# Patient Record
Sex: FEMALE | Race: BLACK OR AFRICAN AMERICAN | NOT HISPANIC OR LATINO | Employment: STUDENT | URBAN - METROPOLITAN AREA
[De-identification: names, ages, dates, MRNs, and addresses within clinical notes are randomized per-mention and may not be internally consistent; named-entity substitution may affect disease eponyms.]

---

## 2025-06-05 ENCOUNTER — HOSPITAL ENCOUNTER (EMERGENCY)
Facility: HOSPITAL | Age: 35
Discharge: HOME | End: 2025-06-05
Payer: MEDICAID

## 2025-06-05 VITALS
RESPIRATION RATE: 16 BRPM | BODY MASS INDEX: 27.28 KG/M2 | DIASTOLIC BLOOD PRESSURE: 72 MMHG | OXYGEN SATURATION: 100 % | SYSTOLIC BLOOD PRESSURE: 118 MMHG | HEIGHT: 68 IN | WEIGHT: 180 LBS | HEART RATE: 78 BPM | TEMPERATURE: 97 F

## 2025-06-05 DIAGNOSIS — M54.6 ACUTE RIGHT-SIDED THORACIC BACK PAIN: Primary | ICD-10-CM

## 2025-06-05 PROCEDURE — 96372 THER/PROPH/DIAG INJ SC/IM: CPT

## 2025-06-05 PROCEDURE — 99284 EMERGENCY DEPT VISIT MOD MDM: CPT

## 2025-06-05 PROCEDURE — 2500000001 HC RX 250 WO HCPCS SELF ADMINISTERED DRUGS (ALT 637 FOR MEDICARE OP)

## 2025-06-05 PROCEDURE — 2500000005 HC RX 250 GENERAL PHARMACY W/O HCPCS

## 2025-06-05 PROCEDURE — 2500000004 HC RX 250 GENERAL PHARMACY W/ HCPCS (ALT 636 FOR OP/ED)

## 2025-06-05 RX ORDER — KETOROLAC TROMETHAMINE 30 MG/ML
15 INJECTION, SOLUTION INTRAMUSCULAR; INTRAVENOUS ONCE
Status: COMPLETED | OUTPATIENT
Start: 2025-06-05 | End: 2025-06-05

## 2025-06-05 RX ORDER — METHOCARBAMOL 500 MG/1
500 TABLET, FILM COATED ORAL 2 TIMES DAILY
Qty: 10 TABLET | Refills: 0 | Status: SHIPPED | OUTPATIENT
Start: 2025-06-05 | End: 2025-06-10

## 2025-06-05 RX ORDER — ACETAMINOPHEN 325 MG/1
975 TABLET ORAL ONCE
Status: COMPLETED | OUTPATIENT
Start: 2025-06-05 | End: 2025-06-05

## 2025-06-05 RX ORDER — LIDOCAINE 560 MG/1
1 PATCH PERCUTANEOUS; TOPICAL; TRANSDERMAL ONCE
Status: DISCONTINUED | OUTPATIENT
Start: 2025-06-05 | End: 2025-06-05 | Stop reason: HOSPADM

## 2025-06-05 RX ORDER — ACETAMINOPHEN 500 MG
1000 TABLET ORAL EVERY 8 HOURS PRN
Qty: 18 TABLET | Refills: 0 | Status: SHIPPED | OUTPATIENT
Start: 2025-06-05 | End: 2025-06-08

## 2025-06-05 RX ORDER — NAPROXEN 500 MG/1
500 TABLET ORAL EVERY 12 HOURS
Qty: 6 TABLET | Refills: 0 | Status: SHIPPED | OUTPATIENT
Start: 2025-06-05 | End: 2025-06-08

## 2025-06-05 RX ORDER — LIDOCAINE 50 MG/G
1 PATCH TOPICAL DAILY
Qty: 5 PATCH | Refills: 0 | Status: SHIPPED | OUTPATIENT
Start: 2025-06-05 | End: 2025-06-10

## 2025-06-05 RX ADMIN — LIDOCAINE 4% 1 PATCH: 40 PATCH TOPICAL at 18:09

## 2025-06-05 RX ADMIN — ACETAMINOPHEN 975 MG: 325 TABLET, FILM COATED ORAL at 18:09

## 2025-06-05 RX ADMIN — KETOROLAC TROMETHAMINE 15 MG: 30 INJECTION, SOLUTION INTRAMUSCULAR at 18:09

## 2025-06-05 ASSESSMENT — COLUMBIA-SUICIDE SEVERITY RATING SCALE - C-SSRS
6. HAVE YOU EVER DONE ANYTHING, STARTED TO DO ANYTHING, OR PREPARED TO DO ANYTHING TO END YOUR LIFE?: NO
2. HAVE YOU ACTUALLY HAD ANY THOUGHTS OF KILLING YOURSELF?: NO
1. IN THE PAST MONTH, HAVE YOU WISHED YOU WERE DEAD OR WISHED YOU COULD GO TO SLEEP AND NOT WAKE UP?: NO

## 2025-06-05 ASSESSMENT — PAIN SCALES - GENERAL: PAINLEVEL_OUTOF10: 7

## 2025-06-05 ASSESSMENT — PAIN DESCRIPTION - DESCRIPTORS: DESCRIPTORS: ACHING

## 2025-06-05 ASSESSMENT — PAIN - FUNCTIONAL ASSESSMENT: PAIN_FUNCTIONAL_ASSESSMENT: 0-10

## 2025-06-05 ASSESSMENT — PAIN DESCRIPTION - PAIN TYPE: TYPE: ACUTE PAIN

## 2025-06-05 ASSESSMENT — PAIN DESCRIPTION - LOCATION: LOCATION: BACK

## 2025-06-05 NOTE — DISCHARGE INSTRUCTIONS
Return to the ED immediately if new or worsening signs or symptoms  Please follow with your primary care doctor within 3 days  Please return to the ED immediately if you begin to have difficulty controlling your bowel or bladder, numbness in the genital/groin area, or any other new or worsening signs or symptoms

## 2025-06-05 NOTE — ED PROVIDER NOTES
HPI   Chief Complaint   Patient presents with    Back Pain       Patient is a 34-year-old female with an otherwise benign PMH presents to the ED with concerns for back pain. She localizes the pain to the mid-back and more prominent on the right side. She describes the pain as a constant ache and rates it as a 7/10. No h/o of injury to the site or falls. She does not remember anything precipitating the pain. She regularly lifts weights which she has done recently and works as a hairstylist. She believes that the pain is worse with activity including standing up, walking, or twisting, although the pain is still present at rest. She has not tried anything for the pain. Denies saddle anesthesia, urinary/fecal incontinence or retention, unexpected weight loss, arthralgia, IV drug use, headache, chest pain, SOB, or leg edema.  He is not anticoagulated.  No urinary symptoms.  No history of PE or DVT.  No recent travel or surgeries.  No recent hospitalizations.  She reports that she was lying on the couch yesterday when she tried to get up and the back pain started hurting. She was ok when she was lying on the couch doing nothing. she denies any other symptoms or concerns at this time.      History provided by:  Patient   used: No            Patient History   Medical History[1]  Surgical History[2]  Family History[3]  Social History[4]    Physical Exam   ED Triage Vitals [06/05/25 1716]   Temperature Heart Rate Respirations BP   36.1 °C (97 °F) 78 16 118/72      Pulse Ox Temp Source Heart Rate Source Patient Position   100 % Temporal -- --      BP Location FiO2 (%)     -- --       Physical Exam  General: The pain the patient is sitting comfortably no acute distress.  Vital signs per nursing note.  Skin: No rashes, lesions, scars.  Normal skin turgor.  HEENT: The head is atraumatic normocephalic.  The neck is supple.  The trachea is midline. 5/5 strength in the trapezius and SCMs bilaterally.  The head is  atraumatic normocephalic.  PERRL, EOMI without nystagmus.  External ear anatomy is normal.  Hearing is grossly intact.  Lungs: Lungs are clear to auscultation bilaterally.  No rhonchi, wheezing, or rales.  No stridor.  Symmetric chest expansion  Heart: Normal S1-S2 no murmurs, rubs, gallops.  Abdomen: Abdomen is flat, nontender, nondistended.  No rebound tenderness or guarding.  Normoactive bowel sounds.  No pulsatile mass.  No CVA tenderness  Peripheral vascular: Symmetric 2+ radial, dorsalis pedis, and posterior tibial pulses.  Capillary refill is under 3 seconds.  Neurologic: Alert and oriented x4.  5/5 strength in the upper and lower extremity.  Sensation is intact in the upper and lower extremity.  2+ bicipital and patellar reflex.  Normal sensation on the back of the head and neck.  5 out of 5 strength of the deltoid, biceps wrist/finger extensors and flexors finger abduction and abduction.  Normal sensation of the trunk. Normal Babinski.  Normal gait.  Musculoskeletal: No overlying skin changes throughout the entire back.  No tenderness over the spinal processes throughout the back.  There is tenderness to palpation over the paraspinal muscles in the right thoracic paraspinal muscles /just above the right CVA.   Full range of motion of the back.    : Deferred    ED Course & MDM   ED Course as of 06/05/25 1805   Thu Jun 05, 2025 1805 Denies any chance of pregnancy [MC]      ED Course User Index  [MC] Yobany Monge PA-C         Diagnoses as of 06/05/25 1805   Acute right-sided thoracic back pain                 No data recorded     [place] Coma Scale Score: 15 (06/05/25 1717 : Aakash Quijano RN)                           Medical Decision Making  34-year-old female otherwise healthy presents to the ED today with a chief concern of back pain.  Vital signs reassuring.  Patient overall appears well and is nontoxic-appearing.  Patient has full range of motion of the neck without meningismus.  Satting well on  room air.  Not hypoxic.  Not tachycardic.  Afebrile.Patient was given Tylenol, Toradol, and a topical lidocaine patch in the ED.  She has no signs of cord compression.  No emergent MRI indicated at this time.  She has no risk factors for epidural abscess or hematoma.  She has no midline spinal tenderness, step-offs, or deformities.  No zoster rash.  She has clear reproducible musculoskeletal tenderness on exam.  She has no  symptoms.  I have low suspicion for  pathology at this time.  Do not think further workup with urinalysis is indicated at this time.  She has no risk factors for a PE.  Do not think further workup with D-dimer or CT PE study is indicated at this time.  She has 2+ symmetric pulses throughout.  Low suspicion for dissection.  No abdominal pain or tenderness.  Not concern for pancreatitis.  Do not think further workup with lipase or CT imaging is indicated at this time.  Symptoms likely related to musculoskeletal pathology.  Will treat outpatient with Tylenol, Toradol, Robaxin, and topical lidocaine patches.  Discussed use and possible side effects of these medications.  She has clear reproducible musculoskeletal tenderness on exam.  Offered imaging patient declines.  Discussed my personal findings with patient and she feels comfortable returning home.  We discussed very strict return precautions including returning for any new or worsening signs symptoms.  Patient is in agreement with this plan.  She will follow-up with the PCP within 3 days.  Discussed signs and symptoms of cord compression.    Differential diagnosis: Back strain, AAA rupture, cauda equina syndrome, cord compression, compression fracture, epidural abscess, epidural hematoma, herniated disc, sciatica, nephrolithiasis, ureterolithiasis, PE, pyelonephritis, vertebral fracture, pancreatitis, zoster    Disposition/treatment  1.  See diagnosis    Shared decision-making was used patient feels comfortable returning home     Patient was  advised to follow up with recommended provider in 1 day for another evaluation and exam. I advised patient/guardian to return or go to closest emergency room immediately if symptoms change, get worse, new symptoms develop prior to follow up. If there is no improvement in symptoms in the next 24 hours they are advised to return for further evaluation and exam. I also explained the plan and treatment course. Patient/guardian is in agreement with plan, treatment course, and follow up and states verbally that they will comply.    Patient is homegoing. I discussed the differential; results and discharge plan with the patient and/or family/friend/caregiver if present.  I emphasized the importance of follow-up with the physician I referred them to in the timeframe recommended.  I explained reasons for the patient to return to the Emergency Department. They agreed that if they feel their condition is worsening or if they have any other concern they should call 911 immediately for further assistance. I gave the patient an opportunity to ask all questions they had and answered all of them accordingly. They understand return precautions and discharge instructions. The patient and/or family/friend/caregiver expressed understanding verbally and that they would comply.        This note has been transcribed using voice recognition and may contain grammatical errors, misplaced words, incorrect words, incorrect phrases or other errors.        Procedure  Procedures       Yobany Monge PA-C  06/05/25 1917         [1]   Past Medical History:  Diagnosis Date    Personal history of other infectious and parasitic diseases     History of herpes genitalis   [2] No past surgical history on file.  [3] No family history on file.  [4]   Social History  Tobacco Use    Smoking status: Not on file    Smokeless tobacco: Not on file   Substance Use Topics    Alcohol use: Not on file    Drug use: Not on file        Yobany Monge PA-C  06/05/25  1918